# Patient Record
Sex: FEMALE | Race: WHITE
[De-identification: names, ages, dates, MRNs, and addresses within clinical notes are randomized per-mention and may not be internally consistent; named-entity substitution may affect disease eponyms.]

---

## 2023-07-03 ENCOUNTER — HOSPITAL ENCOUNTER (EMERGENCY)
Dept: HOSPITAL 75 - ER | Age: 33
Discharge: HOME | End: 2023-07-03
Payer: COMMERCIAL

## 2023-07-03 VITALS — BODY MASS INDEX: 31.44 KG/M2 | HEIGHT: 63.78 IN | WEIGHT: 181.88 LBS

## 2023-07-03 VITALS — DIASTOLIC BLOOD PRESSURE: 73 MMHG | SYSTOLIC BLOOD PRESSURE: 110 MMHG

## 2023-07-03 DIAGNOSIS — F17.290: ICD-10-CM

## 2023-07-03 DIAGNOSIS — Z91.040: ICD-10-CM

## 2023-07-03 DIAGNOSIS — S83.91XA: Primary | ICD-10-CM
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
## 2023-07-03 PROCEDURE — 73562 X-RAY EXAM OF KNEE 3: CPT

## 2023-07-03 NOTE — ED LOWER EXTREMITY
General


Chief Complaint:  Lower Extremity


Stated Complaint:  FALL/RIGHT KNEE INJURY


Nursing Triage Note:  


PATIENT REPORTS TO ED FOR RIGHT KNEE PAIN. PER PATIENT SHE FELL ABOUT A WEEK AGO




ON CONCRETE AND NOW IT'S POPPING OUT OF PLACE. PATIENT AMB. TO FT1 WITHOUT 


DIFFICULTY.


Source:  patient


Exam Limitations:  no limitations





History of Present Illness


Date Seen by Provider:  Jul 3, 2023


Time Seen by Provider:  16:29


Initial Comments


32-year-old female presents to the ER with complaint of right knee pain.  She 

states that she fell onto her knee 1 week ago on concrete.  She states she feels

as though her knee gives out on her when she walks.  Reports increased pain with

walking.  Denies any numbness or tingling in her leg.





Allergies and Home Medications


Allergies


Coded Allergies:  


     ondansetron (Verified  Allergy, Intermediate, RASH, 7/3/23)


     latex (Verified  Allergy, Mild, RASH, 7/3/23)





Patient Home Medication List


Home Medication List Reviewed:  Yes





Review of Systems


Constitutional:  no symptoms reported


Musculoskeletal:  see HPI





Past Medical-Social-Family Hx


Patient Social History


Tobacco Use?:  Yes


Smoking Status:  Current Someday Smoker


Use of E-Cig and/or Vaping dev:  Yes


E-Cig or Vaping type used:  Nicotine


Substance use?:  Yes


Substance type:  Marijuana


Substance frequency:  Once in a while


Alcohol Use?:  Yes


Alcohol type:  Hard Liquor, Wine


Alcohol Frequency:  Once in a while


Pt feels they are or have been:  No





Past Medical History


Surgery/Hospitalization HX:  


MED. HX.-ASTHMA, PRE-DIABETIC, HTN











SURG. HX.- TONSILS, TUBAL, AND GALLBLADDER


Last Menstrual Period:  2023





Physical Exam


Vital Signs





Vital Signs - First Documented








 7/3/23





 16:20


 


Temp 36.5


 


Pulse 80


 


Resp 18


 


B/P (MAP) 117/77 (90)


 


Pulse Ox 97


 


O2 Delivery Room Air





Capillary Refill : Less Than 3 Seconds


Height, Weight, BMI


Height: '"


Weight: lbs. oz. kg; 31.00 BMI


Method:


General Appearance:  WD/WN, no apparent distress


Neck:  supple, normal inspection


Cardiovascular:  regular rate, rhythm


Respiratory:  lungs clear, normal breath sounds, no respiratory distress, no 

accessory muscle use


Legs:  right leg pain, right leg soft tissue tenderness


Neurologic/Psychiatric:  alert, normal mood/affect


Skin:  normal color, warm/dry





Progress/Results/Core Measures


Results/Orders


My Orders





Orders - IGNACIA BOOKER


Knee, Right, 3 Views (7/3/23 16:29)


Ace Bandage (7/3/23 17:05)


Crutches (7/3/23 17:05)





Vital Signs/I&O











 7/3/23 7/3/23





 16:20 17:20


 


Temp 36.5 36.9


 


Pulse 80 83


 


Resp 18 18


 


B/P (MAP) 117/77 (90) 110/73


 


Pulse Ox 97 97


 


O2 Delivery Room Air Room Air














Blood Pressure Mean:                    90











Progress


Progress Note :  


Progress Note


Patient seen and evaluated, resting comfortably in recliner, no acute distress. 

Based on exam and symptoms, x-ray of right knee ordered.





1705 x-ray reviewed.  Negative for acute abnormality.  Results discussed with 

patient.  Will discharge with Ace bandage and prescription for crutches.  

Patient instructed to follow-up with primary care provider if pain continues.  

Discharge instructions and return precautions provided.





Diagnostic Imaging





   Diagonstic Imaging:  Xray


   Plain Films/CT/US/NM/MRI:  knee


Comments


                 [facility], [place]





NAME:   VLAD BETANCOURT LOKESH


[facility] REC#:   [id number]


ACCOUNT#:   [id number]


PT STATUS:   REG ER


:   1990


PHYSICIAN:   IGNACIA BOOKER


ADMIT DATE:   23/ER


                                  ***Signed***


Date of Exam:23





KNEE, RIGHT, 3 VIEWS








EXAM: KNEE, RIGHT, 3 VIEWS





INDICATION: Right knee pain. Trauma. Fall onto concrete.





COMPARISON: None.





FINDINGS/


IMPRESSION: 


1. No fracture or malalignment.


2. No joint effusion.


3. No radiopaque foreign bodies or soft tissue gas.





Dictated by: 





  Dictated on workstation # [id number]








Dict:   23


Trans:   238


[facility] [id number]





Interpreted by:     EBONIE HUGHES MD


Electronically signed by: EBONIE HUGHES 





Departure


Impression





   Primary Impression:  


   Sprain of knee


Disposition:  01 HOME, SELF-CARE


Condition:  Stable





Departure-Patient Inst.


Decision time for Depature:  17:11


Referrals:  


[facility]


Patient Instructions:  Knee Sprain (DC)





Add. Discharge Instructions:  


Wear the Ace bandage for compression.


Use the crutches to keep weight off of your knee.  You will need to take the 

prescription to the pharmacy or the [facility].


Ice your knee for 20 minutes at a time several times a day.


Elevate your knee above the level of your heart as often as possible.


You may take 800 mg of ibuprofen every 8 hours with food as needed for pain.


You may also take 1000 mg of Tylenol every 8 hours as needed for pain.


Follow-up with your primary care provider if your pain continues.


Return for any new, concerning, or worsening symptoms.





All discharge instructions reviewed with patient and/or family. Voiced 

understanding.











IGNACIA BOOKER         Jul 3, 2023 16:44

## 2023-07-03 NOTE — DIAGNOSTIC IMAGING REPORT
EXAM: KNEE, RIGHT, 3 VIEWS



INDICATION: Right knee pain. Trauma. Fall onto concrete.



COMPARISON: None.



FINDINGS/

IMPRESSION: 

1. No fracture or malalignment.

2. No joint effusion.

3. No radiopaque foreign bodies or soft tissue gas.



Dictated by: 



  Dictated on workstation # [id number]

## 2023-07-08 ENCOUNTER — HOSPITAL ENCOUNTER (EMERGENCY)
Dept: HOSPITAL 75 - ER | Age: 33
Discharge: HOME | End: 2023-07-08
Payer: MEDICAID

## 2023-07-08 VITALS — BODY MASS INDEX: 31.63 KG/M2 | WEIGHT: 182.98 LBS | HEIGHT: 63.78 IN

## 2023-07-08 VITALS — DIASTOLIC BLOOD PRESSURE: 73 MMHG | SYSTOLIC BLOOD PRESSURE: 111 MMHG

## 2023-07-08 DIAGNOSIS — Z28.310: ICD-10-CM

## 2023-07-08 DIAGNOSIS — Z91.040: ICD-10-CM

## 2023-07-08 DIAGNOSIS — F17.200: ICD-10-CM

## 2023-07-08 DIAGNOSIS — S80.01XA: Primary | ICD-10-CM
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
## 2023-07-08 PROCEDURE — 99283 EMERGENCY DEPT VISIT LOW MDM: CPT

## 2023-07-08 NOTE — ED LOWER EXTREMITY
General


Chief Complaint:  Lower Extremity


Stated Complaint:  INJ RIGHT KNEE


Nursing Triage Note:  


pt presents to ed via pov from home with complaints of continued r knee pain 


since falling over a baby gate 3 days ago. pt reports she was seen in ed for 


initial injury and told it was a sprain but reports no improvement.





History of Present Illness


Date Seen by Provider:  Jul 8, 2023


Time Seen by Provider:  13:15


Initial Comments


32 year old female evaluated for right knee pain. Previous visit for same 

concern at this ED on 7/3/2023, with x-ray findings normal. She hit the right 

knee on a baby gate, then tripped over the gate and fell onto her right knee. 

She has been able to continue working, she has been using the Tylenol and 

ibuprofen with her last dose of ibuprofen at approximately 0530 this morning.  

She is using crutches however she forgot to bring them with her today.  She has 

two 4in Ace wrap on her right knee. No new injuries. She denies having a PCP.


Pain/Injury Location:  right knee


Method of Injury:  fell





Allergies and Home Medications


Allergies


Coded Allergies:  


     ondansetron (Verified  Allergy, Intermediate, RASH, 7/3/23)


     latex (Verified  Allergy, Mild, RASH, 7/3/23)





Patient Home Medication List


Home Medication List Reviewed:  Yes





Review of Systems


Constitutional:  no symptoms reported, see HPI


Musculoskeletal:  see HPI, joint pain (right knee)


Skin:  no symptoms reported, see HPI





All Other Systems Reviewed


Negative Unless Noted:  Yes





Past Medical-Social-Family Hx


Patient Social History


Tobacco Use?:  No


Smokeless Tobacco Frequency:  Current Someday User


Substance use?:  Yes


Substance type:  Marijuana


Substance frequency:  Once in a while


Alcohol Use?:  Yes


Alcohol Frequency:  Once in a while


Pt feels they are or have been:  No





Past Medical History


Surgery/Hospitalization HX:  


MED. HX.-ASTHMA, PRE-DIABETIC, HTN











SURG. HX.- TONSILS, TUBAL, AND GALLBLADDER





Family Medical History


Reviewed Nursing Family Hx





Physical Exam


Vital Signs





Vital Signs - First Documented








 7/8/23 7/8/23





 13:06 14:06


 


Temp 35.9 


 


Pulse 82 


 


Resp 18 


 


B/P (MAP) 116/82 (93) 


 


Pulse Ox 99 


 


O2 Delivery  Room Air





Capillary Refill : Less Than 3 Seconds


Height, Weight, BMI


Height: '"


Weight: lbs. oz. kg; 31.00 BMI


Method:


General Appearance:  WD/WN


Cardiovascular:  normal peripheral pulses, regular rate, rhythm


Respiratory:  chest non-tender, lungs clear, normal breath sounds


Knees:  right knee normal range of motion, right knee bone tenderness (lateral 

aspect), right knee joint effusion (small), right knee pain (anterior), right 

knee soft tissue tenderness (anterior), right knee other (no instability right 

knee, neg lachman/ant drawer.)


Neurologic/Psychiatric:  no motor/sensory deficits, alert, normal mood/affect, 

oriented x 3


Skin:  ecchymosis (anterior right knee, reports present since the injury)





Progress/Results/Core Measures


Results/Orders


My Orders





Orders - SILVERIOWILLI


Acetaminophen  Tablet (Tylenol  Tablet) (7/8/23 13:45)





Vital Signs/I&O











 7/8/23 7/8/23





 13:06 14:06


 


Temp 35.9 


 


Pulse 82 65


 


Resp 18 


 


B/P (MAP) 116/82 (93) 111/73


 


Pulse Ox 99 100


 


O2 Delivery  Room Air














Blood Pressure Mean:                    93











Departure


Impression





   Primary Impression:  


   Right knee pain


   Qualified Codes:  M25.561 - Pain in right knee


   Additional Impression:  


   Contusion of right knee


   Qualified Codes:  S80.01XD - Contusion of right knee, subsequent encounter


Disposition:  01 HOME, SELF-CARE


Condition:  Improved





Departure-Patient Inst.


Decision time for Depature:  13:30


Referrals:  


[facility]/MAGALYS COONEY,LOCAL PHYSICIAN (PCP)


Primary Care Physician








KATTY CARTY MD, MICHAEL P MD


Patient Instructions:  Knee Sprain (DC), Knee Pain (DC)





Add. Discharge Instructions:  


Continue to use crutches for ambulation.


Alternate between Tylenol 650 mg and ibuprofen 600 mg every 4 hours for pain.


Use the Ace wrap to your right knee.


Establish care with [facility] or if your insurance allows you may see 

orthopedics if symptoms are not improving in 4 to 6 weeks.


It is common for you to have pain in the knee for 3-4 weeks, but you should 

notice slow, steady improvement. 


If it is not improving, an MRI is the next step and that will need to be 

scheduled outpatient through primary care or orthopedics.


We do not order MRIs in the ER for knee problems like you are experiencing. 


Return to the emergency department for new, urgent healthcare needs.





All discharge instructions reviewed with patient and/or family. Voiced 

understanding.











WILLI SAWYER [facility]                   Jul 8, 2023 13:51

## 2023-09-10 ENCOUNTER — HOSPITAL ENCOUNTER (EMERGENCY)
Dept: HOSPITAL 75 - ER | Age: 33
Discharge: HOME | End: 2023-09-10
Payer: MEDICAID

## 2023-09-10 VITALS — HEIGHT: 66.93 IN | BODY MASS INDEX: 27.34 KG/M2 | WEIGHT: 174.17 LBS

## 2023-09-10 VITALS — DIASTOLIC BLOOD PRESSURE: 80 MMHG | SYSTOLIC BLOOD PRESSURE: 112 MMHG

## 2023-09-10 DIAGNOSIS — R42: ICD-10-CM

## 2023-09-10 DIAGNOSIS — R43.8: ICD-10-CM

## 2023-09-10 DIAGNOSIS — Z91.040: ICD-10-CM

## 2023-09-10 DIAGNOSIS — U07.1: Primary | ICD-10-CM

## 2023-09-10 DIAGNOSIS — R09.81: ICD-10-CM

## 2023-09-10 PROCEDURE — 99283 EMERGENCY DEPT VISIT LOW MDM: CPT

## 2023-09-10 PROCEDURE — 87636 SARSCOV2 & INF A&B AMP PRB: CPT

## 2023-09-10 PROCEDURE — 87430 STREP A AG IA: CPT

## 2023-09-10 NOTE — ED COUGH/URI
General


Chief Complaint:  Oral/Throat Problems


Stated Complaint:  SORE THROAT/LOSS OF TASTE AND SMELL


Nursing Triage Note:  


PATIENT AMBULATORY TO ROOM WITH COMPLAINT OF SORE THROAT STARTING YESTERDAY, 


LOSS OF TASE AND SMELL, COUGH, AND FEELING LIGHT HEADED TODAY. PATIENT DENIES 


BEING AROUND ANYONE WHO IS COVID POSITIVE


Source:  patient





History of Present Illness


Date Seen by Provider:  Sep 10, 2023


Time Seen by Provider:  21:50


Initial Comments


PT ARRIVES VIA POV FROM HOME WITH A MALE


PT STATES SHE BEGAN GETTING SICK YESTERDAY WITH:


-NASAL CONGESTION


-SLIGHT COUGH


-SORE THROAT


-LOSS OF TASTE AND SMELL


-FELT A LITTLE LIGHTHEADED TODAY





SHE DOES NOT KNOW IF SHE HAS HAD FEVER OR NOT, BUT NO SWEATS OR CHILLS


NO GI SYMPTOMS


NO SHORTNESS OF BREATH OR WHEEZING


NO HEADACHE


NO BODY ACHES





PT IS NOT COVID OR FLU VACCINATED





NO KNOWN SICK CONTACTS





SHE TOOK IBUPROFEN YESTERDAY X 1 AND TOOK AN UNKNOWN COLD MEDICATION X 1 TODAY





PCP; [facility]





Allergies and Home Medications


Allergies


Coded Allergies:  


     ondansetron (Verified  Allergy, Intermediate, RASH, 7/3/23)


     latex (Verified  Allergy, Mild, RASH, 7/3/23)





Patient Home Medication List


Home Medication List Reviewed:  Yes





Review of Systems


Review of Systems


Constitutional:  see HPI, dizziness


EENTM:  see HPI


Respiratory:  see HPI


Cardiovascular:  no symptoms reported


Gastrointestinal:  no symptoms reported


Genitourinary:  no symptoms reported


Musculoskeletal:  no symptoms reported


Skin:  no symptoms reported


Psychiatric/Neurological:  No Symptoms Reported


Hematologic/Lymphatic:  No Symptoms Reported


Immunological/Allergic:  no symptoms reported





Past Medical-Social-Family Hx


Patient Social History


Tobacco Use?:  No


Substance use?:  No


Alcohol Use?:  No





Past Medical History


Surgery/Hospitalization HX:  


MED. HX.-ASTHMA, PRE-DIABETIC, HTN











SURG. HX.- TONSILS, TUBAL, AND GALLBLADDER


Surgeries:  Yes


Gallbladder, Tonsillectomy, Tubal Ligation


Respiratory:  Yes


Asthma


Cardiac:  Yes


Hypertension


Neurological:  No


Genitourinary:  No


Gastrointestinal:  No


Musculoskeletal:  No


Endocrine:  No


HEENT:  No


Cancer:  No


Psychosocial:  No


Integumentary:  No


Blood Disorders:  No





Physical Exam





Vital Signs - First Documented








 9/10/23





 21:38


 


Temp 36.6


 


Pulse 75


 


Resp 20


 


B/P (MAP) 112/80 (91)


 


Pulse Ox 99


 


O2 Delivery Room Air





Capillary Refill : Less Than 3 Seconds


Height: '"


Weight: lbs. oz. kg; 27.00 BMI


Method:


General Appearance:  WD/WN, no apparent distress, other (DOES NOT APPEAR ILL OR 

TO BE IN ANY DISCOMFORT OR DISTRESS.)


HEENT:  PERRL/EOMI, TMs normal, pharynx normal, other (MILD NASAL CONGESTION)


Neck:  normal inspection


Respiratory:  normal breath sounds, no respiratory distress, no accessory muscle

use


Cardiovascular:  regular rate, rhythm, no murmur


Gastrointestinal:  non tender


Extremities:  normal inspection, normal capillary refill


Neurologic/Psychiatric:  CNs II-XII nml as tested, no motor/sensory deficits, 

alert, normal mood/affect, oriented x 3


Skin:  normal color, warm/dry





Progress/Results/Core Measures


Suspected Sepsis


SIRS


Temperature: 


Pulse: 75 


Respiratory Rate: 20


 


Blood Pressure 112 /80 


Mean: 91





Results/Orders


Lab Results





Laboratory Tests








Test


 9/10/23


21:44 Range/Units


 


 


Influenza Type A (RT-PCR) Not Detected  Not Detecte  


 


Influenza Type B (RT-PCR) Not Detected  Not Detecte  


 


SARS-CoV-2 RNA (RT-PCR) Detected H Not Detecte  


 


Group A Streptococcus Screen Not Detected  NotDetected  








My Orders





Orders - JEANNIE SARMIENTO DO


Rapid Strep A Screen (9/10/23 21:50)


Covid 19 Inhouse Test (9/10/23 21:50)


Influenza A And B By Pcr (9/10/23 21:50)





Vital Signs/I&O











 9/10/23 9/10/23





 21:38 22:33


 


Temp 36.6 36.6


 


Pulse 75 75


 


Resp 20 20


 


B/P (MAP) 112/80 (91) 112/80


 


Pulse Ox 99 99


 


O2 Delivery Room Air Room Air





Capillary Refill : Less Than 3 Seconds








Blood Pressure Mean:                    91








Progress Note :  


Progress Note





PLACED IN ISOLATION ROOM


PPE WORN





VITALS ON ARRIVAL: TEMP 36.6=97.8, HR 75, RR 20, /80, O2 SAT 99% ON ROOM 

AIR





FLU NEGATIVE


COVID POSITIVE





UNEVENTFUL ER STAY





NO COUGH


NO DYSPNEA


NO HYPOXIA 


NO FEVER


DURING ER STAY





AS PT'S SYMPTOMS ARE VERY MILD, DOES NOT WARRANT ANTIVIRAL THERAPY AT THIS TIME


SHE HAS HISTORY OF ASTHMA BUT IT IS VERY MILD AND RARELY USES AN INHALER AND HAS

NOT USED IT RECENTLY. 





DISCUSSED TEST RESULTS, ANTICIPATED COURSE, SYMPTOMATIC TREATMENT, QUARANTINE, 

NEED FOR FOLLOW UP AND RETURN PRECAUTIONS





WORK NOTE GIVEN--PT WORKS AT [employer]





Departure


Impression





   Primary Impression:  


   COVID-19 virus infection


Disposition:  01 HOME, SELF-CARE


Condition:  Stable





Departure-Patient Inst.


Decision time for Depature:  22:29


Referrals:  


[facility]


Patient Instructions:  COVID-19 (DC), Preventing the Spread of an Infectious 

Disease





Add. Discharge Instructions:  


HOME, REST





QUARANTINE FOR 10 DAYS





TYLENOL AND MOTRIN AS NEEDED FOR PAIN OR FEVER





LOTS OF CLEAR LIQUIDS





OVER THE COUNTER MEDICATIONS FOR COUGH AND CONGESTION





FOLLOW UP WITH [facility] IN 5-7  DAYS IF NO BETTER, RETURN TO ER IF YOU DEVELOP 

DIFFICULTY BREATHING 





All discharge instructions reviewed with patient and/or family. Voiced 

understanding.


Work/School Note:  Family Work Note,          Work Release Form   Date Seen in 

the Emergency Department:  Sep 10, 2023


   Return to Work:  Sep 20, 2023











JEANNIE SARMIENTO DO                 Sep 10, 2023 22:02